# Patient Record
Sex: FEMALE | Race: OTHER
[De-identification: names, ages, dates, MRNs, and addresses within clinical notes are randomized per-mention and may not be internally consistent; named-entity substitution may affect disease eponyms.]

---

## 2019-04-10 ENCOUNTER — HOSPITAL ENCOUNTER (INPATIENT)
Dept: HOSPITAL 54 - DS | Age: 64
LOS: 2 days | Discharge: NURSING, REHAB OR LONG-TERM CARE | DRG: 470 | End: 2019-04-12
Attending: NURSE PRACTITIONER | Admitting: NURSE PRACTITIONER
Payer: COMMERCIAL

## 2019-04-10 VITALS — DIASTOLIC BLOOD PRESSURE: 89 MMHG | SYSTOLIC BLOOD PRESSURE: 135 MMHG

## 2019-04-10 VITALS — DIASTOLIC BLOOD PRESSURE: 73 MMHG | SYSTOLIC BLOOD PRESSURE: 134 MMHG

## 2019-04-10 VITALS — SYSTOLIC BLOOD PRESSURE: 129 MMHG | DIASTOLIC BLOOD PRESSURE: 96 MMHG

## 2019-04-10 VITALS — SYSTOLIC BLOOD PRESSURE: 154 MMHG | DIASTOLIC BLOOD PRESSURE: 73 MMHG

## 2019-04-10 VITALS — DIASTOLIC BLOOD PRESSURE: 85 MMHG | SYSTOLIC BLOOD PRESSURE: 154 MMHG

## 2019-04-10 VITALS — SYSTOLIC BLOOD PRESSURE: 143 MMHG | DIASTOLIC BLOOD PRESSURE: 78 MMHG

## 2019-04-10 VITALS — HEIGHT: 62 IN | WEIGHT: 151 LBS | BODY MASS INDEX: 27.79 KG/M2

## 2019-04-10 VITALS — DIASTOLIC BLOOD PRESSURE: 89 MMHG | SYSTOLIC BLOOD PRESSURE: 154 MMHG

## 2019-04-10 VITALS — SYSTOLIC BLOOD PRESSURE: 145 MMHG | DIASTOLIC BLOOD PRESSURE: 82 MMHG

## 2019-04-10 VITALS — DIASTOLIC BLOOD PRESSURE: 96 MMHG | SYSTOLIC BLOOD PRESSURE: 129 MMHG

## 2019-04-10 VITALS — DIASTOLIC BLOOD PRESSURE: 85 MMHG | SYSTOLIC BLOOD PRESSURE: 152 MMHG

## 2019-04-10 VITALS — DIASTOLIC BLOOD PRESSURE: 84 MMHG | SYSTOLIC BLOOD PRESSURE: 143 MMHG

## 2019-04-10 DIAGNOSIS — E66.9: ICD-10-CM

## 2019-04-10 DIAGNOSIS — G47.00: ICD-10-CM

## 2019-04-10 DIAGNOSIS — D64.9: ICD-10-CM

## 2019-04-10 DIAGNOSIS — F41.9: ICD-10-CM

## 2019-04-10 DIAGNOSIS — M17.11: Primary | ICD-10-CM

## 2019-04-10 DIAGNOSIS — I10: ICD-10-CM

## 2019-04-10 DIAGNOSIS — K21.9: ICD-10-CM

## 2019-04-10 PROCEDURE — L1830 KO IMMOB CANVAS LONG PRE OTS: HCPCS

## 2019-04-10 PROCEDURE — C1713 ANCHOR/SCREW BN/BN,TIS/BN: HCPCS

## 2019-04-10 PROCEDURE — A4217 STERILE WATER/SALINE, 500 ML: HCPCS

## 2019-04-10 PROCEDURE — A6402 STERILE GAUZE <= 16 SQ IN: HCPCS

## 2019-04-10 PROCEDURE — G0378 HOSPITAL OBSERVATION PER HR: HCPCS

## 2019-04-10 PROCEDURE — 0SRC0J9 REPLACEMENT OF RIGHT KNEE JOINT WITH SYNTHETIC SUBSTITUTE, CEMENTED, OPEN APPROACH: ICD-10-PCS | Performed by: SPECIALIST

## 2019-04-10 RX ADMIN — Medication PRN MG: at 21:15

## 2019-04-10 RX ADMIN — SODIUM CHLORIDE, SODIUM LACTATE, POTASSIUM CHLORIDE, AND CALCIUM CHLORIDE PRN MLS/HR: .6; .31; .03; .02 INJECTION, SOLUTION INTRAVENOUS at 12:21

## 2019-04-10 RX ADMIN — DEXTROSE MONOHYDRATE SCH MLS/HR: 50 INJECTION, SOLUTION INTRAVENOUS at 17:20

## 2019-04-10 RX ADMIN — FAMOTIDINE SCH MG: 20 TABLET, FILM COATED ORAL at 21:13

## 2019-04-10 RX ADMIN — LOSARTAN POTASSIUM SCH MG: 25 TABLET, FILM COATED ORAL at 21:13

## 2019-04-10 NOTE — NUR
MS/RN - PT Eval

Seen and evaluated by PT, weight bearing as tolerated, to apply right knee CPM. Patient able 
to ambulate in the room with walker.

## 2019-04-10 NOTE — NUR
MS/RN - Admission

Received a direct admit for elective surgery right total knee arthroplasty with Dr. Flor 
today. Patient is A/O x 4, denies pain at this time, no apparent distress, stable on room 
air. Patient oriented to room and use of call light. Patient is ambulatory with min assist. 
Skin is intact, refused photo to be taken. All belongings accounted, eyeglasses, cellphone 
and  at bedside and the rest of the belongings were sent home with son Franki. Inserted 
PIV on the RFA g22 with good blood return, flushed well. Pre-op teachings given to patient 
and she verbalized understanding. Patient NPO since yesterday at 10:00 PM. Plan of care 
discussed with patient and family. Fall precautions initiated. Will continue to monitor 
closely.

## 2019-04-10 NOTE — NUR
RN MS OPENING NOTES

RECEIVED PATIENT IN BED AWAKE, ALERT AND ORIENTED X4, RESPIRATIONS EVEN AND UNLABORED WITH 
EQUAL RISE AND FALL OF CHEST, DENIES ANY PAIN OR DISCOMFORT AT THIS TIME, IV SITE TO RIGHT 
FA #22G INTACT AND PATENT, IVF RUNNING AS ORDERED, NO REDNESS , NO INFILTRATION PRESENT, 
CURRENTLY ON CPM MACHINE AT 35 DEGREE FLEXION , TOLERATING WELL, DRESSING AND ACE BANDAGE TO 
RLE INTACT CLEAN AND DRY, ORIENTED TO STAFF AND CALL LIGHT AND KEPT WITHIN REACH, SAFETY 
PRECAUTIONS IN PLACE, LOW BED AND LOCKED, FLUIDS AND BED SIDE COMMODE OFFERED, ALL NEEDS 
ATTENDED AT THIS TIME REMAINS COMFORTABLE WILL CONTINUE TO MONITOR AND ATTEND TO NEEDS.

## 2019-04-10 NOTE — NUR
MS/RN - End of shift summary

Patient resting comfortably, right knee pain is tolerable, remain afebrile, IVF LR at 75 
ml/hr infusing well on the RFA with no signs of infiltration. Patient tolerated CPM with 35 
degrees flexion for 1.5 hours, right knee dressing is clean, dry, and intact, ice in place. 
Will endorse to the night nurse accordingly.

## 2019-04-10 NOTE — NUR
RN MS NOTES

 PATIENT REQUESTING TO HAVE SLEEP MEDICATION .

TRAZODONE OFFERED AS ORDERED FOR INSOMNIA, PATIENT AGREED 

TRAZODONE PRN GIVEN AS ORDERED. BED SIDE COMMODE OFFERED, AT THIS TIME CPM MACHINE REMOVED 
TOLERATED FOR 4 HOURS.

ALL NEEDS ATTENDED AT THIS TIME.

## 2019-04-10 NOTE — NUR
MS/RN - Notes

Patient came back from PACU, alert and oriented, denies pain, on oxygen at 2lpm via NC, s/p 
right total knee arthroplasty, right knee immobilizer and ice pack in place with dressing 
clean, dry, and intact. All post-op orders noted and carried out. Vital signs monitored per 
protocol. Will continue with current medical management.

## 2019-04-10 NOTE — NUR
MS/RN - Notes

Patient sent to OR for right total knee arthroplasty, pre-op medications given as ordered, 
pre-procedure checklist completed, consent in the chart. Saline lock on the RFA is patent 
and intact. Patient endorsed accordingly.

## 2019-04-10 NOTE — NUR
RN MS NOTES

PATIENT COMPLAINT OF PAIN TO RLE 8/10, REQUESTING FOR MORPHINE

VS /78,92,18,95%.

MORPHINE PRN GIVEN AS ORDERED

 WILL CONTINUE TO MONITOR FOR EFFECTIVENESS.

## 2019-04-11 VITALS — DIASTOLIC BLOOD PRESSURE: 62 MMHG | SYSTOLIC BLOOD PRESSURE: 142 MMHG

## 2019-04-11 VITALS — SYSTOLIC BLOOD PRESSURE: 142 MMHG | DIASTOLIC BLOOD PRESSURE: 62 MMHG

## 2019-04-11 VITALS — DIASTOLIC BLOOD PRESSURE: 62 MMHG | SYSTOLIC BLOOD PRESSURE: 113 MMHG

## 2019-04-11 VITALS — DIASTOLIC BLOOD PRESSURE: 56 MMHG | SYSTOLIC BLOOD PRESSURE: 154 MMHG

## 2019-04-11 LAB
HCT VFR BLD AUTO: 32 % (ref 33–45)
HGB BLD-MCNC: 10.5 G/DL (ref 11.5–14.8)

## 2019-04-11 RX ADMIN — Medication PRN MG: at 21:00

## 2019-04-11 RX ADMIN — LOSARTAN POTASSIUM SCH MG: 25 TABLET, FILM COATED ORAL at 21:26

## 2019-04-11 RX ADMIN — Medication PRN MG: at 05:43

## 2019-04-11 RX ADMIN — Medication SCH MG: at 17:17

## 2019-04-11 RX ADMIN — FAMOTIDINE SCH MG: 20 TABLET, FILM COATED ORAL at 20:59

## 2019-04-11 RX ADMIN — FAMOTIDINE SCH MG: 20 TABLET, FILM COATED ORAL at 08:02

## 2019-04-11 RX ADMIN — SODIUM CHLORIDE, SODIUM LACTATE, POTASSIUM CHLORIDE, AND CALCIUM CHLORIDE PRN MLS/HR: .6; .31; .03; .02 INJECTION, SOLUTION INTRAVENOUS at 02:47

## 2019-04-11 RX ADMIN — DEXTROSE MONOHYDRATE SCH MLS/HR: 50 INJECTION, SOLUTION INTRAVENOUS at 02:07

## 2019-04-11 RX ADMIN — Medication PRN MG: at 14:13

## 2019-04-11 RX ADMIN — Medication PRN MG: at 17:58

## 2019-04-11 RX ADMIN — ASPIRIN SCH MG: 325 TABLET, FILM COATED ORAL at 08:02

## 2019-04-11 NOTE — NUR
RN MS NOTES 

PATIENT C/O PAIN TO RIGHT LEG 7/10 REQUESTING FOR MORPHINE

VS /60,84,18,95%

MORPHINE PRN GIVEN AS ORDERED, WILL CONTINUE TO MONITOR FOR EFFECTIVENESS.

## 2019-04-11 NOTE — NUR
MS/RN - S/B Hospitalist 

Seen and examined by Baudilio Wiseman NP with order to check labs in the morning and DC planning.

## 2019-04-11 NOTE — NUR
MS/RN - S/b Dr. Navarrete

Seen and examined by Dr. Navarrete with no new orders. Current pain management is effective per 
patient.

## 2019-04-11 NOTE — NUR
MS/RN - Assessment

Patient awake, A/O x 4, reports mild pain to right knee op site but refused medication for 
now, afebrile, stable on room air. Skin is intact except for right knee surgical incision 
POD#1 right total knee arthroplasty with Dr. Flor. Right knee dressing is c/d/i without 
drainage, discharge, surrounding erythema, or excess warmth. Skin on BLE is warm to touch, 
negative calf tenderness, negative for edema, sensation on both lower ext are intact, weight 
bearing as tolerated on the RLE. IVF LR at 75 ml/hr infusing well on the left hand with no 
signs of infiltration. Labs reviewed, WNL. Fall precautions maintained. All needs attended 
and met. Will continue with current plan of care.

## 2019-04-11 NOTE — NUR
RN MS CLOSING NOTES

 PATIENT IN BED AWAKE, ALERT AND ORIENTED X4, RESPIRATIONS EVEN AND UNLABORED WITH EQUAL 
RISE AND FALL OF CHEST, DENIES ANY PAIN OR DISCOMFORT AT THIS TIME, IV SITE TO LEFT HAND 
#22G INTACT AND PATENT, IVF RUNNING AS ORDERED, NO REDNESS , NO INFILTRATION PRESENT,  
TOLERATING CPM MACHINE FOR 4 HOURS DURING SHIFT, DRESSING AND ACE BANDAGE TO RLE INTACT 
CLEAN AND DRY,  CALL LIGHT  KEPT WITHIN REACH, SAFETY PRECAUTIONS IN PLACE, LOW BED AND 
LOCKED, FLUIDS AND BED SIDE COMMODE OFFERED, TOLERATED BED SIDE COMMODE WELL,  ALL NEEDS 
ATTENDED AT THIS TIME REMAINS COMFORTABLE WILL CONTINUE TO MONITOR AND ATTEND TO NEEDS WILL 
ENDORSE TO NEXT SHIFT.

## 2019-04-11 NOTE — NUR
RN MS NOTES

IV SITE TO RIGHT FA NOTED LEAKING,  SLIGHT INFILTRATED, NOTED WITH SLIGHT REDNESS, IV SITE 
REMOVED TOLERATED WELL , SITE CLEANSED ELEVATED AND ICE APPLIED WILL CONTINUE TO MONITOR,

NEW IV SITE TO LEFT HAND #22 G INTACT AND PATENT.

## 2019-04-12 VITALS — DIASTOLIC BLOOD PRESSURE: 52 MMHG | SYSTOLIC BLOOD PRESSURE: 130 MMHG

## 2019-04-12 LAB
BASOPHILS # BLD AUTO: 0 /CMM (ref 0–0.2)
BASOPHILS NFR BLD AUTO: 0.4 % (ref 0–2)
BUN SERPL-MCNC: 14 MG/DL (ref 7–18)
CALCIUM SERPL-MCNC: 8 MG/DL (ref 8.5–10.1)
CHLORIDE SERPL-SCNC: 99 MMOL/L (ref 98–107)
CO2 SERPL-SCNC: 27 MMOL/L (ref 21–32)
CREAT SERPL-MCNC: 0.6 MG/DL (ref 0.6–1.3)
EOSINOPHIL NFR BLD AUTO: 0.3 % (ref 0–6)
GLUCOSE SERPL-MCNC: 118 MG/DL (ref 74–106)
HCT VFR BLD AUTO: 27 % (ref 33–45)
HGB BLD-MCNC: 9.4 G/DL (ref 11.5–14.8)
LYMPHOCYTES NFR BLD AUTO: 2.3 /CMM (ref 0.8–4.8)
LYMPHOCYTES NFR BLD AUTO: 23.3 % (ref 20–44)
MCHC RBC AUTO-ENTMCNC: 34 G/DL (ref 31–36)
MCV RBC AUTO: 84 FL (ref 82–100)
MONOCYTES NFR BLD AUTO: 1 /CMM (ref 0.1–1.3)
MONOCYTES NFR BLD AUTO: 10.7 % (ref 2–12)
NEUTROPHILS # BLD AUTO: 6.4 /CMM (ref 1.8–8.9)
NEUTROPHILS NFR BLD AUTO: 65.3 % (ref 43–81)
PLATELET # BLD AUTO: 155 /CMM (ref 150–450)
POTASSIUM SERPL-SCNC: 3.7 MMOL/L (ref 3.5–5.1)
RBC # BLD AUTO: 3.27 MIL/UL (ref 4–5.2)
SODIUM SERPL-SCNC: 135 MMOL/L (ref 136–145)
WBC NRBC COR # BLD AUTO: 9.8 K/UL (ref 4.3–11)

## 2019-04-12 RX ADMIN — ASPIRIN SCH MG: 325 TABLET, FILM COATED ORAL at 08:27

## 2019-04-12 RX ADMIN — Medication PRN MG: at 08:29

## 2019-04-12 RX ADMIN — Medication PRN MG: at 14:26

## 2019-04-12 RX ADMIN — Medication PRN MG: at 04:30

## 2019-04-12 RX ADMIN — FAMOTIDINE SCH MG: 20 TABLET, FILM COATED ORAL at 08:27

## 2019-04-12 RX ADMIN — Medication SCH MG: at 08:27

## 2019-04-12 RX ADMIN — SODIUM CHLORIDE, SODIUM LACTATE, POTASSIUM CHLORIDE, AND CALCIUM CHLORIDE PRN MLS/HR: .6; .31; .03; .02 INJECTION, SOLUTION INTRAVENOUS at 00:13

## 2019-04-12 NOTE — NUR
PATIENT CLEARED FOR  DISCHARGE  TO ACUTE REHAB BY MD. PATIENT ALERT AND ORIENTED X4, VS ARE 
STABLE AND WITHIN NORMAL RANGE, PAIN MEDICATION GIVEN PRIOR TO D/C FOR COMFORT. DRESSING 
CHANGED PRIOR TO D/C AND PICTURE TAKEN AND PLACED TO PATIENT'S CHART. NEW PRESCRIPTION 
PROVIDED. DISCHARGE INSTRUCTIONS AND EDUCATION PROVIDED TO PATIENT; VERBALIZED 
UNDERSTANDING. PATIENT WILL F/U WITH SURGEON IN TWO WEEKS. IV ASSESS AND ID WRIST BANDS 
REMOVED. VALUABLE FORM SIGHED BY PATIENT AS WELL AS D/C INSTRUCTIONS. PATIENT PICKED UP BY 
NON-EMERGENCY TRANSPORTATION, LEFT UNIT VIA GURNEY.

## 2019-04-12 NOTE — NUR
RN CLOSING NOTES



PATIENT IS AWAKE RESTING COMFORTABLY IN BED. A/O X 4. NO SIGNS OF RESPIRATORY DISTRESS. 
PATIENT DENIES SOB. DENIES PAIN AT THIS TIME. SAFETY PRECAUTIONS IMPLEMENTED. CALL LIGHT 
WITHIN REACH. ALL NEEDS WERE MET. WILL ENDORSE TO AM RN.

## 2019-09-24 NOTE — NUR
MS/RN - End of shift summary

Patient resting comfortably, pain much better today, remain afebrile, IVF infusing well on 
the left hand with no signs of infiltration. Patient able to walk with PT 70 ft-100 ft, 
tolerated CPM with 35 degrees flexion for 3 hours, per ortho dressing change tomorrow. All 
needs attended. Will endorse to the night nurse accordingly. Sski Pregnancy And Lactation Text: This medication is Pregnancy Category D and isn't considered safe during pregnancy. It is excreted in breast milk.

## 2024-07-25 NOTE — NUR
RN OPENING NOTES



RECEIVED PATIENT AWAKE, RESTING COMFORTABLY. FAMILY IS AT BEDSIDE. PATIENT HAS NO SIGNS OF 
RESPIRATORY DISTRESS. PATIENT DENIES SHORTNESS OF BREATH. SAFETY PRECAUTIONS IMPLEMENTED. 
CALL LIGHT WITHIN REACH. WILL CONTINUE TO MONITOR PATIENT THROUGHOUT MY SHIFT. SPIRITUAL HEALTH SERVICES Note     Saw pt [place] and administered Mandaeism sacrament of anointing for the healing of the sick.    Fr. Jason Mcfarlane